# Patient Record
Sex: FEMALE | Race: WHITE | NOT HISPANIC OR LATINO | Employment: FULL TIME | URBAN - METROPOLITAN AREA
[De-identification: names, ages, dates, MRNs, and addresses within clinical notes are randomized per-mention and may not be internally consistent; named-entity substitution may affect disease eponyms.]

---

## 2024-03-06 ENCOUNTER — TELEPHONE (OUTPATIENT)
Dept: OPHTHALMOLOGY | Facility: CLINIC | Age: 34
End: 2024-03-06

## 2024-03-07 ENCOUNTER — TELEPHONE (OUTPATIENT)
Dept: OPHTHALMOLOGY | Facility: CLINIC | Age: 34
End: 2024-03-07

## 2024-04-08 ENCOUNTER — ALLIED HEALTH/NURSE VISIT (OUTPATIENT)
Dept: OPHTHALMOLOGY | Facility: CLINIC | Age: 34
End: 2024-04-08
Attending: OPHTHALMOLOGY
Payer: COMMERCIAL

## 2024-04-08 DIAGNOSIS — Z79.899 LONG-TERM USE OF PLAQUENIL: Primary | ICD-10-CM

## 2024-04-08 PROCEDURE — 92082 INTERMEDIATE VISUAL FIELD XM: CPT

## 2024-04-08 PROCEDURE — 92274 MULTIFOCAL ERG W/I&R: CPT

## 2024-04-08 ASSESSMENT — VISUAL ACUITY
CORRECTION_TYPE: GLASSES
OD_CC: 20/25
METHOD: SNELLEN - LINEAR
OS_CC: 20/25

## 2024-04-08 NOTE — LETTER
2024         RE:  :  MRN: Gracie Dsouza  1990  [id number]     Dear Dr. Carpenter:     Your patient, Gracie Dsouza, came for a visual field and multifocal electroretinogram only.       Assessment & Plan     Gracie Dsouza is a 33 year old female with the following diagnoses:   1. Long-term use of Plaquenil      10-2 visual field:   Reliable both eyes, normal both eyes     Multifocal electroretinogram:   This is a well-performed and reliable multifocal electroretinogram both eyes.  The amplitudes and latencies are normal throughout all 103 hexagons both eyes.  There is no evidence of plaquenil toxicity.    Again, thank you for allowing me to participate in the care of your patient.      Sincerely,    Dedrick Anthony MD  Professor  Ophthalmology Residency   Director of Neuro-Ophthalmology  [employer] Endowed Chair  Departments of Ophthalmology, Neurology, and Neurosurgery  [facility] [address] [place]  [address]  T - [phone]  F - [phone]  REYNALDO [email or web address]      DX = normal multifocal electroretinogram, normal visual field

## 2024-04-08 NOTE — NURSING NOTE
"Chief Complaints and History of Present Illnesses   Patient presents with    Procedure mfERG    procedure mfERG     Chief Complaint(s) and History of Present Illness(es)       Procedure mfERG               procedure mfERG               Comments    mfERG + VF for plaquenil monitoring  Referred bu Marycarmen Carpenter/ [facility]  Started plaquenil 2018 400mg/day. 3/2024 decreased to 200mg/day  Height 5'9\", weight 175lbs.  Shahla Isaac COA 3:32 PM April 8, 2024                      "

## 2024-04-08 NOTE — LETTER
2024     RE:  :  MRN: Gracie Dsouza  1990  [id number]     Dear  Referred Self    Your patient,Gracie Dsouza, returned for neuro-ophthalmic follow up. My assessment and plan are below.  For further details, please see my attached clinic note.      Assessment & Plan     Gracie Dsouza is a 33 year old female with the following diagnoses:   1. Long-term use of Plaquenil         10-2 visual field:   Reliable both eyes, normal both eyes     Multifocal electroretinogram:   This is a well-performed and reliable multifocal electroretinogram both eyes.  The amplitudes and latencies are normal throughout all 103 hexagons both eyes.  There is no evidence of plaquenil toxicity.            Again, thank you for allowing me to participate in the care of your patient.      Sincerely,      Dedrick Anthony MD  Professor  Director of Neuro-Ophthalmology  [employer] Chair  Departments of Ophthalmology, Neurology, and Neurosurgery  [facility] [address] [place]  [address]  T - [phone]   - [phone]  REYNALDO [email or web address]      CC: [unfilled]    DX = ***

## 2024-04-08 NOTE — LETTER
2024     RE:  :  MRN: Gracie Dsouza  1990  [id number]     Dear Dr. Carpenter:     Your patient,Gracie Dsouza, came for a multifocal electroretinogram and visual field only.      Assessment & Plan     Gracie Dsouza is a 33 year old female with the following diagnoses:   1. Long-term use of Plaquenil         10-2 visual field:   Reliable both eyes, normal both eyes     Multifocal electroretinogram:   This is a well-performed and reliable multifocal electroretinogram both eyes.  The amplitudes and latencies are normal throughout all 103 hexagons both eyes.  There is no evidence of plaquenil toxicity.            Again, thank you for allowing me to participate in the care of your patient.      Sincerely,      Dedrick Anthony MD  Professor  Director of Neuro-Ophthalmology  [employer] Chair  Departments of Ophthalmology, Neurology, and Neurosurgery  [facility] [address] [place]  [address]  T - [phone]  F - [phone]  REYNALDO [email or web address]      CC:     DX =      Otezla Counseling: The side effects of Otezla were discussed with the patient, including but not limited to worsening or new depression, weight loss, diarrhea, nausea, upper respiratory tract infection, and headache. Patient instructed to call the office should any adverse effect occur.  The patient verbalized understanding of the proper use and possible adverse effects of Otezla.  All the patient's questions and concerns were addressed.

## 2024-04-09 NOTE — PROGRESS NOTES
Assessment & Plan     Gracie Dsouza is a 33 year old female with the following diagnoses:   1. Long-term use of Plaquenil         10-2 visual field:   Reliable both eyes, normal both eyes     Multifocal electroretinogram:   This is a well-performed and reliable multifocal electroretinogram both eyes.  The amplitudes and latencies are normal throughout all 103 hexagons both eyes.  There is no evidence of plaquenil toxicity.             Attending Physician Attestation:  Complete documentation of historical and exam elements from today's encounter can be found in the full encounter summary report (not reduplicated in this progress note).  I personally obtained the chief complaint(s) and history of present illness.  I confirmed and edited as necessary the review of systems, past medical/surgical history, family history, social history, and examination findings as documented by others; and I examined the patient myself.  I personally reviewed the relevant tests, images, and reports as documented above.  I formulated and edited as necessary the assessment and plan and discussed the findings and management plan with the patient and family. - Dedrick Anthony MD

## 2024-05-19 ENCOUNTER — HEALTH MAINTENANCE LETTER (OUTPATIENT)
Age: 34
End: 2024-05-19

## 2025-06-08 ENCOUNTER — HEALTH MAINTENANCE LETTER (OUTPATIENT)
Age: 35
End: 2025-06-08